# Patient Record
Sex: FEMALE | Race: WHITE | URBAN - METROPOLITAN AREA
[De-identification: names, ages, dates, MRNs, and addresses within clinical notes are randomized per-mention and may not be internally consistent; named-entity substitution may affect disease eponyms.]

---

## 2024-02-19 ENCOUNTER — OFFICE VISIT (OUTPATIENT)
Facility: CLINIC | Age: 89
End: 2024-02-19
Payer: MEDICARE

## 2024-02-19 DIAGNOSIS — S72.001D HIP FRACTURE REQUIRING OPERATIVE REPAIR, RIGHT, CLOSED, WITH ROUTINE HEALING, SUBSEQUENT ENCOUNTER: Primary | ICD-10-CM

## 2024-02-19 PROCEDURE — 99306 1ST NF CARE HIGH MDM 50: CPT | Performed by: INTERNAL MEDICINE

## 2024-02-19 PROCEDURE — 1123F ACP DISCUSS/DSCN MKR DOCD: CPT | Performed by: INTERNAL MEDICINE

## 2024-02-19 NOTE — PROGRESS NOTES
PLACE OF SERVICE:  [facility] [facility] [address] [place] [address]    H & P    2/19/2024    Chief Complaint:    Right hip fracture SP CMN    HPI : Mary Christensen is a 89 y.o. female history of chronic respiratory failure COPD hypothyroidism who lives alone by herself.  Patient uses a walker to get around at home.  She was brought to the emergency room after fall.  Patient reports that she was walking in her home and her legs gave up she does not remember tripping or or getting dizzy or lightheaded.  She was diagnosed with right hip fracture underwent CMN.  And is admitted here for skilled rehab.    PMH:   Chronic hypoxic respiratory failure  COPD  History of bradycardia  Sensorineural hearing loss  Hypothyroidism    ROS:   The following system review was negative:  Constitutional; Respiratory; Cardiovascular; Genitourinary; Gastrointestinal; Psychiatric; Ear-Nose-Throat; Musculoskeletal; Neurologic; Endocrine; Hematologic; Skin; Eyes; denies any    Medications: Reviewed in EMR and assessment and plan    Social History: Looks stated age underweight  Family History: Noncontributory     Code Status:    Allergies: Reviewed  Not on File    Medications: Reviewed    Vitals: /80 pulse 80 respiration 18 temperature 97.5      Exam:  Constitutional: No acute distress;   Eyes: Sclera clear, PERRLA;   Ears/nose/mouth/throat:mmm, OP clear, trachea midline;  Cardiovascular: RRR,nml S1 and S2, no rubs murmurs or gallops, no edema, no cyanosis;   Respiratory: Clear to auscultation, symmetric, no respiratory distress; on O2  Gastrointestinal: Abdomen soft, NT, ND, no masses, normal bowel sounds;  Neurologic: Cranial nerves II through VII grossly intact, no speech or motor deficits A&O in time place and person  Skin: No rash, warm and dry;  Musculoskeletal: No erythema swelling or joint tenderness, extremities without cyanosis, neck supple, ROM intact spine and extremities; surgical dressing to the left

## 2024-02-20 ENCOUNTER — OFFICE VISIT (OUTPATIENT)
Facility: CLINIC | Age: 89
End: 2024-02-20
Payer: MEDICARE

## 2024-02-20 DIAGNOSIS — S72.001D HIP FRACTURE REQUIRING OPERATIVE REPAIR, RIGHT, CLOSED, WITH ROUTINE HEALING, SUBSEQUENT ENCOUNTER: Primary | ICD-10-CM

## 2024-02-20 PROCEDURE — 1123F ACP DISCUSS/DSCN MKR DOCD: CPT | Performed by: INTERNAL MEDICINE

## 2024-02-20 PROCEDURE — 99309 SBSQ NF CARE MODERATE MDM 30: CPT | Performed by: INTERNAL MEDICINE

## 2024-02-20 NOTE — PROGRESS NOTES
PLACE OF SERVICE:  [facility] [place] [address]    SKILLED VISIT    2/20/2024    Chief Complaint:    Right hip fracture SP CMN    HPI : Mary Christensen is a 89 y.o. female history of chronic respiratory failure COPD hypothyroidism who lives alone by herself.  Patient uses a walker to get around at home.  She was brought to the emergency room after fall.  Patient reports that she was walking in her home and her legs gave up she does not remember tripping or or getting dizzy or lightheaded.  She was diagnosed with right hip fracture underwent CMN.  And is admitted here for skilled rehab.    Patient is seen for follow-up.  Breathing stable on current 4 L nasal cannula.  She is complaining of constipation added MiraLAX she is already on senna.    PMH:   Chronic hypoxic respiratory failure  COPD  History of bradycardia  Sensorineural hearing loss  Hypothyroidism    ROS:   The following system review was negative:  Constitutional; Respiratory; Cardiovascular; Genitourinary; Gastrointestinal; Psychiatric; Ear-Nose-Throat; Musculoskeletal; Neurologic; Endocrine; Hematologic; Skin; Eyes; denies any    Medications: Reviewed in EMR and assessment and plan    Social History: Looks stated age underweight  Family History: Noncontributory     Code Status:    Allergies: Reviewed  Not on File    Medications: Reviewed    Vitals: BP 1 /64 pulse 72 respiration 18 temperature 98  Exam:  Constitutional: No acute distress;   Eyes: Sclera clear, PERRLA;   Ears/nose/mouth/throat:mmm, OP clear, trachea midline;  Cardiovascular: RRR,nml S1 and S2, no rubs murmurs or gallops, no edema, no cyanosis;   Respiratory: Clear to auscultation, symmetric, no respiratory distress; on O2  Gastrointestinal: Abdomen soft, NT, ND, no masses, normal bowel sounds;  Neurologic: Cranial nerves II through VII grossly intact, no speech or motor deficits A&O in time place and person  Skin: No rash, warm and

## 2024-02-21 ENCOUNTER — OFFICE VISIT (OUTPATIENT)
Facility: CLINIC | Age: 89
End: 2024-02-21
Payer: MEDICARE

## 2024-02-21 DIAGNOSIS — S72.001D HIP FRACTURE REQUIRING OPERATIVE REPAIR, RIGHT, CLOSED, WITH ROUTINE HEALING, SUBSEQUENT ENCOUNTER: Primary | ICD-10-CM

## 2024-02-21 PROCEDURE — 1123F ACP DISCUSS/DSCN MKR DOCD: CPT | Performed by: INTERNAL MEDICINE

## 2024-02-21 PROCEDURE — 99309 SBSQ NF CARE MODERATE MDM 30: CPT | Performed by: INTERNAL MEDICINE

## 2024-02-21 NOTE — PROGRESS NOTES
PLACE OF SERVICE:  [place] [place] [address]      SKILLED VISIT    2/21/2024    Chief Complaint:    Right hip fracture SP CMN    HPI : Mary Christensen is a 89 y.o. female history of chronic respiratory failure COPD hypothyroidism who lives alone by herself.  Patient uses a walker to get around at home.  She was brought to the emergency room after fall.  Patient reports that she was walking in her home and her legs gave up she does not remember tripping or or getting dizzy or lightheaded.  She was diagnosed with right hip fracture underwent CMN.  And is admitted here for skilled rehab.    Patient is high risk for readmission.  She is sitting in the chair breathing stable on current 4 L nasal cannula pain is controlled she has been working with PT OT but does not remember.  Therapist reminded her she had a session with her and was taken to the bathroom.    PMH:   Chronic hypoxic respiratory failure  COPD  History of bradycardia  Sensorineural hearing loss  Hypothyroidism    ROS:   The following system review was negative:  Constitutional; Respiratory; Cardiovascular; Genitourinary; Gastrointestinal; Psychiatric; Ear-Nose-Throat; Musculoskeletal; Neurologic; Endocrine; Hematologic; Skin; Eyes; denies any    Medications: Reviewed in EMR and assessment and plan    Social History: Looks stated age underweight  Family History: Noncontributory     Code Status:    Allergies: Reviewed  Not on File    Medications: Reviewed    Vitals: /61 pulse 70 respiration 18 temperature 97  Exam:  Constitutional: No acute distress;   Eyes: Sclera clear, PERRLA;   Ears/nose/mouth/throat:mmm, OP clear, trachea midline;  Cardiovascular: RRR,nml S1 and S2, no rubs murmurs or gallops, no edema, no cyanosis;   Respiratory: Clear to auscultation, symmetric, no respiratory distress; on O2  Gastrointestinal: Abdomen soft, NT, ND, no masses, normal bowel sounds;  Neurologic: Cranial nerves II through

## 2024-02-26 ENCOUNTER — OFFICE VISIT (OUTPATIENT)
Facility: CLINIC | Age: 89
End: 2024-02-26
Payer: MEDICARE

## 2024-02-26 DIAGNOSIS — M62.81 MUSCLE WEAKNESS: Primary | ICD-10-CM

## 2024-02-26 PROCEDURE — 1123F ACP DISCUSS/DSCN MKR DOCD: CPT | Performed by: INTERNAL MEDICINE

## 2024-02-26 PROCEDURE — 99309 SBSQ NF CARE MODERATE MDM 30: CPT | Performed by: INTERNAL MEDICINE

## 2024-02-26 NOTE — PROGRESS NOTES
PLACE OF SERVICE:  [facility] [place] [address]      2/26/2024    Chief Complaint:    Right hip fracture SP CMN    HPI : Mary Christensen is a 89 y.o. female history of chronic respiratory failure COPD hypothyroidism who lives alone by herself.  Patient uses a walker to get around at home.  She was brought to the emergency room after fall.  Patient reports that she was walking in her home and her legs gave up she does not remember tripping or or getting dizzy or lightheaded.  She was diagnosed with right hip fracture underwent CMN.  And is admitted here for skilled rehab.    Patient is high risk for readmission.  Patient has been working with PT OT I have confirmed this with the therapy department she is forgetful does not remember.  Does not appear to be in pain stable over the weekend.  Patient lives by herself daughter's lives in [place] she is a high risk cannot be by herself she will need either around-the-clock caregivers or assisted living discussed with staff  PMH:   Chronic hypoxic respiratory failure  COPD  History of bradycardia  Sensorineural hearing loss  Hypothyroidism    ROS:   The following system review was negative:  Constitutional; Respiratory; Cardiovascular; Genitourinary; Gastrointestinal; Psychiatric; Ear-Nose-Throat; Musculoskeletal; Neurologic; Endocrine; Hematologic; Skin; Eyes; denies any    Medications: Reviewed in EMR and assessment and plan    Social History: Looks stated age underweight  Family History: Noncontributory     Code Status:    Allergies: Reviewed  Not on File    Medications: Reviewed    Vitals: 1/29/1961 pulse 90 respiration 14 temperature 97.7  Exam:  Constitutional: No acute distress;   Eyes: Sclera clear, PERRLA;   Ears/nose/mouth/throat:mmm, OP clear, trachea midline;  Cardiovascular: RRR,nml S1 and S2, no rubs murmurs or gallops, no edema, no cyanosis;   Respiratory: Clear to auscultation, symmetric, no respiratory distress; on

## 2024-02-28 ENCOUNTER — OFFICE VISIT (OUTPATIENT)
Facility: CLINIC | Age: 89
End: 2024-02-28
Payer: MEDICARE

## 2024-02-28 DIAGNOSIS — S72.001D HIP FRACTURE REQUIRING OPERATIVE REPAIR, RIGHT, CLOSED, WITH ROUTINE HEALING, SUBSEQUENT ENCOUNTER: Primary | ICD-10-CM

## 2024-02-28 PROCEDURE — 99309 SBSQ NF CARE MODERATE MDM 30: CPT | Performed by: INTERNAL MEDICINE

## 2024-02-28 PROCEDURE — 1123F ACP DISCUSS/DSCN MKR DOCD: CPT | Performed by: INTERNAL MEDICINE

## 2024-02-28 NOTE — PROGRESS NOTES
bowel sounds;  Neurologic: Cranial nerves II through VII grossly intact, no speech or motor deficits A&O in time place and person  Skin: No rash, warm and dry;  Musculoskeletal: No erythema swelling or joint tenderness, extremities without cyanosis, neck supple, ROM intact spine and extremities; surgical dressing to the left hip  Psychiatric: Not agitated.  Appropriate affect, mood, judgment and insight.  Genitourinary: No suprapubic tenderness or flank tenderness  Heme, lymph, immuno: No pallor;       Labs:    Sodium 141 potassium 4.6 BUN 22 creatinine 0.59  White count 5.3 hemoglobin 9.3 platelets 228    Assessment/Plans:     Right hip fracture SP CMN  Admit here for skilled rehab she is high risk for readmission  PT OT eval  She is weightbearing as tolerated  Lovenox for DVT prophylaxis for 30 days  on oxycodone for pain control    Chronic hypoxic respiratory failure  Due to COPD continue O2 to his nasal cannula continuous    COPD  Does not appear to be in exacerbation  Continue inhalers    Hypothyroidism  Continue levothyroxine 25 mcg daily        Johnny Bruce MD

## 2024-02-29 ENCOUNTER — OFFICE VISIT (OUTPATIENT)
Facility: CLINIC | Age: 89
End: 2024-02-29

## 2024-02-29 DIAGNOSIS — S72.001D HIP FRACTURE REQUIRING OPERATIVE REPAIR, RIGHT, CLOSED, WITH ROUTINE HEALING, SUBSEQUENT ENCOUNTER: Primary | ICD-10-CM

## 2024-02-29 NOTE — PROGRESS NOTES
PLACE OF SERVICE:  [facility] [address] [place] [address]    SKILLED VISIT    2/29/2024    Chief Complaint:    Right hip fracture SP CMN  14-day recertification    HPI : Mary Christensen is a 89 y.o. female history of chronic respiratory failure COPD hypothyroidism who lives alone by herself.  Patient uses a walker to get around at home.  She was brought to the emergency room after fall.  Patient reports that she was walking in her home and her legs gave up she does not remember tripping or or getting dizzy or lightheaded.  She was diagnosed with right hip fracture underwent CMN.  And is admitted here for skilled rehab.    Patient is high risk for readmission.  She is seen for 14-day recertification.  She is working with PT OT but she is not happy being at the rehab.  Patient lives by herself.  Tried to call her daughter she lives in [place].  I checked with  discharge planning I am told the daughter is planning to hire another caregiver to be with her when she goes home after discharge    PMH:   Chronic hypoxic respiratory failure  COPD  History of bradycardia  Sensorineural hearing loss  Hypothyroidism    ROS:   The following system review was negative:  Constitutional; Respiratory; Cardiovascular; Genitourinary; Gastrointestinal; Psychiatric; Ear-Nose-Throat; Musculoskeletal; Neurologic; Endocrine; Hematologic; Skin; Eyes; denies any    Medications: Reviewed in EMR and assessment and plan    Social History: Looks stated age underweight  Family History: Noncontributory     Code Status:    Allergies: Reviewed  Not on File    Medications: Reviewed    Vitals: /61 pulse 63 respirations 16 temperature 97.6  Exam:  Constitutional: No acute distress;   Eyes: Sclera clear, PERRLA;   Ears/nose/mouth/throat:mmm, OP clear, trachea midline;  Cardiovascular: RRR,nml S1 and S2, no rubs murmurs or gallops, no edema, no cyanosis;   Respiratory: Clear to auscultation, symmetric,

## 2024-03-05 ENCOUNTER — OFFICE VISIT (OUTPATIENT)
Facility: CLINIC | Age: 89
End: 2024-03-05
Payer: MEDICARE

## 2024-03-05 DIAGNOSIS — S72.001D HIP FRACTURE REQUIRING OPERATIVE REPAIR, RIGHT, CLOSED, WITH ROUTINE HEALING, SUBSEQUENT ENCOUNTER: Primary | ICD-10-CM

## 2024-03-05 PROCEDURE — 1123F ACP DISCUSS/DSCN MKR DOCD: CPT | Performed by: INTERNAL MEDICINE

## 2024-03-05 PROCEDURE — 99309 SBSQ NF CARE MODERATE MDM 30: CPT | Performed by: INTERNAL MEDICINE

## 2024-03-06 NOTE — PROGRESS NOTES
PLACE OF SERVICE:  [facility] [place] [address]    SKILLED VISIT      3/5/2024    Chief Complaint:    Right hip fracture SP CMN      HPI : Mary Christensen is a 89 y.o. female history of chronic respiratory failure COPD hypothyroidism who lives alone by herself.  Patient uses a walker to get around at home.  She was brought to the emergency room after fall.  Patient reports that she was walking in her home and her legs gave up she does not remember tripping or or getting dizzy or lightheaded.  She was diagnosed with right hip fracture underwent CMN.  And is admitted here for skilled rehab.    Patient is high risk for readmission.  Is seen for skilled.  Patient is working with PT OT denies dizziness no chest pain breathing stable on current O2 walking better with PT  PMH:   Chronic hypoxic respiratory failure  COPD  History of bradycardia  Sensorineural hearing loss  Hypothyroidism    ROS:   The following system review was negative:  Constitutional; Respiratory; Cardiovascular; Genitourinary; Gastrointestinal; Psychiatric; Ear-Nose-Throat; Musculoskeletal; Neurologic; Endocrine; Hematologic; Skin; Eyes; denies any    Medications: Reviewed in EMR and assessment and plan    Social History: Looks stated age underweight  Family History: Noncontributory     Code Status:    Allergies: Reviewed  Not on File    Medications: Reviewed    Vitals: /70 pulse 63 respirations 16 temperature 97.6  Exam:  Constitutional: No acute distress;   Eyes: Sclera clear, PERRLA;   Ears/nose/mouth/throat:mmm, OP clear, trachea midline;  Cardiovascular: RRR,nml S1 and S2, no rubs murmurs or gallops, no edema, no cyanosis;   Respiratory: Clear to auscultation, symmetric, no respiratory distress; on O2  Gastrointestinal: Abdomen soft, NT, ND, no masses, normal bowel sounds;  Neurologic: Cranial nerves II through VII grossly intact, no speech or motor deficits A&O in time place and person  Skin: No rash,